# Patient Record
(demographics unavailable — no encounter records)

---

## 2025-02-13 NOTE — RISK ASSESSMENT
[0] : 2) Feeling down, depressed, or hopeless: Not at all (0) [PHQ-9 Negative - No further assessment needed] : PHQ-9 Negative - No further assessment needed [No Increased risk of SCA or SCD] : No Increased risk of SCA or SCD    [Have you ever fainted, passed out or had an unexplained seizure suddenly and without warning, especially during exercise or in response] : Have you ever fainted, passed out or had an unexplained seizure suddenly and without warning, especially during exercise or in response to sudden loud noises such as doorbells, alarm clocks and ringing telephones? No [Have you ever had exercise-related chest pain or shortness of breath?] : Have you ever had exercise-related chest pain or shortness of breath? No [Are you related to anyone with hypertrophic cardiomyopathy or hypertrophic obstructive cardiomyopathy, Marfan syndrome, arrhythmogenic] : Are you related to anyone with hypertrophic cardiomyopathy or hypertrophic obstructive cardiomyopathy, Marfan syndrome, arrhythmogenic right ventricular cardiomyopathy, long QT syndrome, short QT syndrome, Brugada syndrome or catecholaminergic polymorphic ventricular tachycardia, or anyone younger than 50 years with a pacemaker or implantable defibrillator? No

## 2025-02-13 NOTE — REVIEW OF SYSTEMS
PATIENT CALLED, STATES SHE WAS IN [facility].   CALL HER [phone] [Negative] : Genitourinary

## 2025-02-13 NOTE — DISCUSSION/SUMMARY
[Normal Growth] : growth [Normal Development] : development  [No Elimination Concerns] : elimination [Continue Regimen] : feeding [No Skin Concerns] : skin [Normal Sleep Pattern] : sleep [None] : no medical problems [Anticipatory Guidance Given] : Anticipatory guidance addressed as per the history of present illness section [No Vaccines] : no vaccines needed [No Medications] : ~He/She~ is not on any medications [Patient] : patient [Parent/Guardian] : Parent/Guardian [FreeTextEntry1] :  recommend outisde therapist - Sarabjit does not want but mom is planning on it d/w sarabjit and he agreed f/u with me in 3 months  labs ordered prior to visit, mom did not go yet, requesting drug testing, sarabjit agrees

## 2025-02-13 NOTE — HISTORY OF PRESENT ILLNESS
[Toothpaste] : Primary Fluoride Source: Toothpaste [No] : Patient has not had sexual intercourse [FreeTextEntry1] : per mom, has been stressed, not sure if he is depressed has been smoking weed says he smokes when people annoy him at school  when he feels his anger building up, he will smoke at a friend's house denies any other drugs or drinking denies feeling sad, no self-harm or SI denies sexual activity  lives with mom, dad lives in [place] mom stopped working so she can get home earlier, goes to school jessika goes to grandparents' house after school when mom at work mom took away his phone, not allowed out after school  otherwise no issues/concerns eats well - good variety too much boris keeps active - wrestling stopped playing lacrosse ("i don't like it") not doing well in school last semester (was ok before that) nml urine/bm +dentist